# Patient Record
Sex: FEMALE | Race: AMERICAN INDIAN OR ALASKA NATIVE | NOT HISPANIC OR LATINO | URBAN - NONMETROPOLITAN AREA
[De-identification: names, ages, dates, MRNs, and addresses within clinical notes are randomized per-mention and may not be internally consistent; named-entity substitution may affect disease eponyms.]

---

## 2019-10-07 ENCOUNTER — NEW PATIENT (OUTPATIENT)
Dept: URBAN - NONMETROPOLITAN AREA CLINIC 3 | Facility: CLINIC | Age: 64
End: 2019-10-07
Payer: OTHER GOVERNMENT

## 2019-10-07 DIAGNOSIS — Z96.1 PRESENCE OF INTRAOCULAR LENS: ICD-10-CM

## 2019-10-07 DIAGNOSIS — E11.39 TYPE 2 DIABETES W OTH DIABETIC OPHTHALMIC COMPLICATION: Primary | ICD-10-CM

## 2019-10-07 DIAGNOSIS — H02.831 DERMATOCHALASIS OF RIGHT UPPER LID: ICD-10-CM

## 2019-10-07 PROCEDURE — 92004 COMPRE OPH EXAM NEW PT 1/>: CPT | Performed by: OPTOMETRIST

## 2019-10-07 ASSESSMENT — VISUAL ACUITY
OS: 20/25
OD: 20/25

## 2019-10-07 ASSESSMENT — INTRAOCULAR PRESSURE
OS: 15
OD: 14

## 2020-01-21 ENCOUNTER — FOLLOW UP ESTABLISHED (OUTPATIENT)
Dept: URBAN - NONMETROPOLITAN AREA CLINIC 3 | Facility: CLINIC | Age: 65
End: 2020-01-21
Payer: OTHER GOVERNMENT

## 2020-01-21 PROCEDURE — 99212 OFFICE O/P EST SF 10 MIN: CPT | Performed by: OPTOMETRIST

## 2020-01-21 PROCEDURE — 92083 EXTENDED VISUAL FIELD XM: CPT | Performed by: OPTOMETRIST

## 2020-01-21 ASSESSMENT — INTRAOCULAR PRESSURE
OS: 14
OD: 14

## 2021-01-07 ENCOUNTER — NEW PATIENT (OUTPATIENT)
Dept: URBAN - NONMETROPOLITAN AREA CLINIC 6 | Facility: CLINIC | Age: 66
End: 2021-01-07
Payer: OTHER GOVERNMENT

## 2021-01-07 DIAGNOSIS — H26.493 OTHER SECONDARY CATARACT, BILATERAL: Primary | ICD-10-CM

## 2021-01-07 PROCEDURE — 99204 OFFICE O/P NEW MOD 45 MIN: CPT | Performed by: OPHTHALMOLOGY

## 2021-01-07 ASSESSMENT — VISUAL ACUITY
OD: 20/25
OS: 20/20

## 2021-01-07 ASSESSMENT — INTRAOCULAR PRESSURE
OD: 19
OS: 19

## 2021-01-20 ENCOUNTER — SURGERY (OUTPATIENT)
Dept: URBAN - NONMETROPOLITAN AREA SURGERY 1 | Facility: SURGERY | Age: 66
End: 2021-01-20
Payer: OTHER GOVERNMENT

## 2021-01-20 PROCEDURE — 66821 AFTER CATARACT LASER SURGERY: CPT | Performed by: OPHTHALMOLOGY

## 2021-01-27 ENCOUNTER — SURGERY (OUTPATIENT)
Dept: URBAN - NONMETROPOLITAN AREA SURGERY 1 | Facility: SURGERY | Age: 66
End: 2021-01-27
Payer: OTHER GOVERNMENT

## 2021-01-27 PROCEDURE — 66821 AFTER CATARACT LASER SURGERY: CPT | Performed by: OPHTHALMOLOGY

## 2021-02-11 ENCOUNTER — POST OP (OUTPATIENT)
Dept: URBAN - NONMETROPOLITAN AREA CLINIC 6 | Facility: CLINIC | Age: 66
End: 2021-02-11
Payer: OTHER GOVERNMENT

## 2021-02-11 PROCEDURE — 99024 POSTOP FOLLOW-UP VISIT: CPT | Performed by: OPTOMETRIST

## 2021-02-11 ASSESSMENT — INTRAOCULAR PRESSURE
OD: 17
OS: 16

## 2021-02-23 ENCOUNTER — OFFICE VISIT (OUTPATIENT)
Dept: URBAN - METROPOLITAN AREA CLINIC 27 | Facility: CLINIC | Age: 66
End: 2021-02-23
Payer: OTHER GOVERNMENT

## 2021-02-23 DIAGNOSIS — E11.3593 TYPE 2 DIABETES MELLITUS WITH PROLIFERATIVE DIABETIC RETINOPATHY WITHOUT MACULAR EDEMA, BILATERAL: ICD-10-CM

## 2021-02-23 PROCEDURE — 92014 COMPRE OPH EXAM EST PT 1/>: CPT | Performed by: OPHTHALMOLOGY

## 2021-02-23 PROCEDURE — 92134 CPTRZ OPH DX IMG PST SGM RTA: CPT | Performed by: OPHTHALMOLOGY

## 2021-02-23 ASSESSMENT — INTRAOCULAR PRESSURE
OD: 12
OS: 13

## 2021-02-23 NOTE — IMPRESSION/PLAN
Impression: Presence of intraocular lens: Z96.1. OU. Plan: S/P YAG OU. Anterior vitreous debris with symptomatic vision loss OD.   If no improvement, consider PPV at follow up

## 2021-02-23 NOTE — IMPRESSION/PLAN
Impression: Vitreous hemorrhage, bilateral: H43.13. OU.
s/p Avastin OS, last 04/10/19
s/p PPV/EL OS 07/17/19 () Plan: Retina remains fully attached OU. VH resolved s/p PPV OS. Resolved VH/SHH OD. PRP complete OU. FA from 8/25/20 showed mild macular leakage OU, no active NVD or NVE OU. Recommend observation. BS/BP control discussed. 

RTC 6 months FA OU (transit OD) with sweeps, OCT OU

## 2021-02-23 NOTE — IMPRESSION/PLAN
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 OU.
s/p Avastin OD 05/15/18
s/p PRP OS 05/13/19; PRP OD 10/19/17 Plan: Exam shows stable PDR with resolved [place] OD. Resolved VH OS s/p PPV. S/P PRP OU, PPV OS.  OCT shows no DME OU. The importance of tight blood sugar, blood pressure, and cholesterol control was reviewed with the patient.  Last A1C- 12.1

## 2021-05-04 ENCOUNTER — OFFICE VISIT (OUTPATIENT)
Dept: URBAN - METROPOLITAN AREA CLINIC 27 | Facility: CLINIC | Age: 66
End: 2021-05-04
Payer: OTHER GOVERNMENT

## 2021-05-04 DIAGNOSIS — H43.13 VITREOUS HEMORRHAGE, BILATERAL: ICD-10-CM

## 2021-05-04 PROCEDURE — 99214 OFFICE O/P EST MOD 30 MIN: CPT | Performed by: OPHTHALMOLOGY

## 2021-05-04 PROCEDURE — 92134 CPTRZ OPH DX IMG PST SGM RTA: CPT | Performed by: OPHTHALMOLOGY

## 2021-05-04 PROCEDURE — 92235 FLUORESCEIN ANGRPH MLTIFRAME: CPT | Performed by: OPHTHALMOLOGY

## 2021-05-04 ASSESSMENT — INTRAOCULAR PRESSURE
OD: 13
OS: 13

## 2021-05-04 NOTE — IMPRESSION/PLAN
Impression: Other vitreous opacities, right eye: H43.391. Right. S/P YAG OD Plan: Retina remains attached and fully intact. Floaters negatively affecting ADLs such as reading and driving. Surgical intervention was offered. R/B/A of PPV discussed, inc bleeding, pain, infection, loss of vision. Pt elects to proceed with surgery. 

SURGICAL PLAN: 25G PPV/EL OD

## 2021-05-04 NOTE — IMPRESSION/PLAN
Impression: Vitreous hemorrhage, bilateral: H43.13. OU.
s/p Avastin OS, last 04/10/19
s/p PPV/EL OS 07/17/19 () Plan: VH resolved s/p PPV OS. Resolved VH/SHH OD. PRP complete OU. Recommend observation. BS/BP control discussed.

## 2021-05-04 NOTE — IMPRESSION/PLAN
Impression: Type 2 diab with prolif diab rtnop without macular edema, bi: [id number]. OU.
s/p Avastin OD 05/15/18
s/p PRP OS 05/13/19; PRP OD 10/19/17 Plan: Exam shows stable PDR with resolved [place] OD. Resolved VH OS s/p PPV. S/P PRP OU, PPV OS.  OCT shows no DME OU. FA shows good macular perfusion OU, minimal leakage OU, no evidence of NVD or NVE OU. Will supplement PRP at time of PPV OD. The importance of tight blood sugar, blood pressure, and cholesterol control was reviewed with the patient.  Last A1C- 9.5

## 2021-05-12 ENCOUNTER — SURGERY (OUTPATIENT)
Dept: URBAN - METROPOLITAN AREA EXTERNAL CLINIC 26 | Facility: EXTERNAL CLINIC | Age: 66
End: 2021-05-12
Payer: OTHER GOVERNMENT

## 2021-05-12 PROCEDURE — 67040 LASER TREATMENT OF RETINA: CPT | Performed by: OPHTHALMOLOGY

## 2021-05-13 ENCOUNTER — POST-OPERATIVE VISIT (OUTPATIENT)
Dept: URBAN - METROPOLITAN AREA CLINIC 27 | Facility: CLINIC | Age: 66
End: 2021-05-13
Payer: OTHER GOVERNMENT

## 2021-05-13 PROCEDURE — 99024 POSTOP FOLLOW-UP VISIT: CPT | Performed by: OPHTHALMOLOGY

## 2021-05-13 ASSESSMENT — INTRAOCULAR PRESSURE
OS: 15
OD: 11

## 2021-05-13 NOTE — IMPRESSION/PLAN
Impression: S/P PPV, EL, OD - 1 Day. Other vitreous opacities, right eye  H43.391. PDR, right eye.  Plan: --retina attached
--no s/s infection
--IOP acceptable
--start PF/Oflox QID
--RD/endoph WS discussed
--f/u 1 week

## 2021-05-20 ENCOUNTER — POST-OPERATIVE VISIT (OUTPATIENT)
Dept: URBAN - METROPOLITAN AREA CLINIC 27 | Facility: CLINIC | Age: 66
End: 2021-05-20
Payer: OTHER GOVERNMENT

## 2021-05-20 DIAGNOSIS — H43.391 OTHER VITREOUS OPACITIES, RIGHT EYE: Primary | ICD-10-CM

## 2021-05-20 PROCEDURE — 99024 POSTOP FOLLOW-UP VISIT: CPT | Performed by: OPHTHALMOLOGY

## 2021-05-20 ASSESSMENT — INTRAOCULAR PRESSURE
OD: 13
OS: 15

## 2021-05-20 NOTE — IMPRESSION/PLAN
Impression: S/P PPV, EL, OD - 8 Days. Other vitreous opacities, right eye  H43.391.  Plan: --retina attached
--no s/s infection
--IOP acceptable
--d/c oflox, taper PF as directed
--RD/endoph WS discussed
--f/u 1 month with OCT

## 2021-06-24 ENCOUNTER — POST-OPERATIVE VISIT (OUTPATIENT)
Dept: URBAN - METROPOLITAN AREA CLINIC 27 | Facility: CLINIC | Age: 66
End: 2021-06-24
Payer: OTHER GOVERNMENT

## 2021-06-24 PROCEDURE — 92134 CPTRZ OPH DX IMG PST SGM RTA: CPT | Performed by: OPHTHALMOLOGY

## 2021-06-24 PROCEDURE — 99024 POSTOP FOLLOW-UP VISIT: CPT | Performed by: OPHTHALMOLOGY

## 2021-06-24 ASSESSMENT — INTRAOCULAR PRESSURE
OS: 19
OD: 18

## 2021-06-24 NOTE — IMPRESSION/PLAN
Impression: S/P PPV, EL, OD - 43 Days. Other vitreous opacities, right eye  H43.391.  Plan: --retina attached
--no s/s infection
--IOP acceptable
--RD/endoph WS discussed
--OCT: no DME/ERM

RTC 6 months DFE/FA/OCT OU

## 2021-12-14 ENCOUNTER — OFFICE VISIT (OUTPATIENT)
Dept: URBAN - METROPOLITAN AREA CLINIC 27 | Facility: CLINIC | Age: 66
End: 2021-12-14
Payer: OTHER GOVERNMENT

## 2021-12-14 PROCEDURE — 92235 FLUORESCEIN ANGRPH MLTIFRAME: CPT | Performed by: OPHTHALMOLOGY

## 2021-12-14 PROCEDURE — 92134 CPTRZ OPH DX IMG PST SGM RTA: CPT | Performed by: OPHTHALMOLOGY

## 2021-12-14 PROCEDURE — 92014 COMPRE OPH EXAM EST PT 1/>: CPT | Performed by: OPHTHALMOLOGY

## 2021-12-14 ASSESSMENT — INTRAOCULAR PRESSURE
OD: 16
OS: 15

## 2021-12-14 NOTE — IMPRESSION/PLAN
Impression: Vitreous hemorrhage, bilateral: H43.13. OU.
s/p Avastin OS, last 04/10/19
s/p PPV/EL OS 07/17/19 () Plan: Resolved OU. Recommend observation. BS/BP control discussed.

## 2021-12-14 NOTE — IMPRESSION/PLAN
Impression: Type 2 diab with prolif diab rtnop without macular edema, bi: [id number]. OU.
s/p Avastin OD 05/15/18
s/p PRP OS 05/13/19; PRP OD 10/19/17 Plan: Exam shows resolved VH OU s/p PRP OU, PPV OU. OCT shows no DME OU. FA shows good macular perfusion OU, minimal leakage OU, no evidence of NVD or NVE OU. Recommend observation. The importance of tight blood sugar, blood pressure, and cholesterol control was reviewed with the patient. Last A1C- 9.5 RTC 6 months for exam/OCT OU

## 2022-07-28 ENCOUNTER — OFFICE VISIT (OUTPATIENT)
Dept: URBAN - METROPOLITAN AREA CLINIC 27 | Facility: CLINIC | Age: 67
End: 2022-07-28
Payer: MEDICARE

## 2022-07-28 DIAGNOSIS — H43.391 OTHER VITREOUS OPACITIES, RIGHT EYE: ICD-10-CM

## 2022-07-28 DIAGNOSIS — Z96.1 PRESENCE OF INTRAOCULAR LENS: ICD-10-CM

## 2022-07-28 DIAGNOSIS — H43.13 VITREOUS HEMORRHAGE, BILATERAL: ICD-10-CM

## 2022-07-28 DIAGNOSIS — E11.3593 TYPE 2 DIABETES MELLITUS WITH PROLIFERATIVE DIABETIC RETINOPATHY WITHOUT MACULAR EDEMA, BILATERAL: Primary | ICD-10-CM

## 2022-07-28 PROCEDURE — 92014 COMPRE OPH EXAM EST PT 1/>: CPT | Performed by: OPHTHALMOLOGY

## 2022-07-28 PROCEDURE — 92134 CPTRZ OPH DX IMG PST SGM RTA: CPT | Performed by: OPHTHALMOLOGY

## 2022-07-28 ASSESSMENT — INTRAOCULAR PRESSURE
OD: 16
OS: 17

## 2022-07-28 NOTE — IMPRESSION/PLAN
Impression: Other vitreous opacities, right eye  H43.391. S/P PPV, EL, OD 05/12/2021 Plan: Doing very well. Floaters have improved significantly. Pt pleased with outcome.

## 2022-07-28 NOTE — IMPRESSION/PLAN
Impression: Type 2 diab with prolif diab rtnop without macular edema, bi: [id number]. OU.
s/p Avastin OD 05/15/18
s/p PRP OS 05/13/19; PRP OD 10/19/17 Plan: Exam continue to demonstrate resolved VH OU s/p PRP OU, PPV OU. OCT shows no DME OU. FA shows good macular perfusion OU, minimal leakage OU, no evidence of NVD or NVE OU. Recommend observation. The importance of tight blood sugar, blood pressure, and cholesterol control was reviewed with the patient. Last A1C- 9.5 RTC 12 months for exam/OCT/FA OU (transit OS) with sweeps

## 2023-08-02 ENCOUNTER — OFFICE VISIT (OUTPATIENT)
Dept: URBAN - METROPOLITAN AREA CLINIC 27 | Facility: CLINIC | Age: 68
End: 2023-08-02
Payer: MEDICARE

## 2023-08-02 DIAGNOSIS — E11.3593 TYPE 2 DIABETES MELLITUS WITH PROLIFERATIVE DIABETIC RETINOPATHY WITHOUT MACULAR EDEMA, BILATERAL: Primary | ICD-10-CM

## 2023-08-02 DIAGNOSIS — Z96.1 PRESENCE OF INTRAOCULAR LENS: ICD-10-CM

## 2023-08-02 DIAGNOSIS — H43.391 OTHER VITREOUS OPACITIES, RIGHT EYE: ICD-10-CM

## 2023-08-02 DIAGNOSIS — H43.13 VITREOUS HEMORRHAGE, BILATERAL: ICD-10-CM

## 2023-08-02 PROCEDURE — 92134 CPTRZ OPH DX IMG PST SGM RTA: CPT | Performed by: OPHTHALMOLOGY

## 2023-08-02 PROCEDURE — 92014 COMPRE OPH EXAM EST PT 1/>: CPT | Performed by: OPHTHALMOLOGY

## 2023-08-02 PROCEDURE — 92235 FLUORESCEIN ANGRPH MLTIFRAME: CPT | Performed by: OPHTHALMOLOGY

## 2023-08-02 ASSESSMENT — INTRAOCULAR PRESSURE
OS: 14
OD: 19

## 2024-08-08 ENCOUNTER — OFFICE VISIT (OUTPATIENT)
Dept: URBAN - METROPOLITAN AREA CLINIC 27 | Facility: CLINIC | Age: 69
End: 2024-08-08
Payer: MEDICARE

## 2024-08-08 DIAGNOSIS — H43.13 VITREOUS HEMORRHAGE, BILATERAL: ICD-10-CM

## 2024-08-08 DIAGNOSIS — H43.391 OTHER VITREOUS OPACITIES, RIGHT EYE: ICD-10-CM

## 2024-08-08 DIAGNOSIS — Z96.1 PRESENCE OF INTRAOCULAR LENS: ICD-10-CM

## 2024-08-08 DIAGNOSIS — E11.3593 TYPE 2 DIABETES MELLITUS WITH PROLIFERATIVE DIABETIC RETINOPATHY WITHOUT MACULAR EDEMA, BILATERAL: Primary | ICD-10-CM

## 2024-08-08 PROCEDURE — 92235 FLUORESCEIN ANGRPH MLTIFRAME: CPT | Performed by: OPHTHALMOLOGY

## 2024-08-08 PROCEDURE — 92134 CPTRZ OPH DX IMG PST SGM RTA: CPT | Performed by: OPHTHALMOLOGY

## 2024-08-08 PROCEDURE — 92014 COMPRE OPH EXAM EST PT 1/>: CPT | Performed by: OPHTHALMOLOGY

## 2024-08-08 PROCEDURE — 92250 FUNDUS PHOTOGRAPHY W/I&R: CPT | Performed by: OPHTHALMOLOGY

## 2024-08-08 ASSESSMENT — INTRAOCULAR PRESSURE
OS: 21
OD: 21